# Patient Record
Sex: FEMALE | Race: WHITE | NOT HISPANIC OR LATINO
[De-identification: names, ages, dates, MRNs, and addresses within clinical notes are randomized per-mention and may not be internally consistent; named-entity substitution may affect disease eponyms.]

---

## 2017-01-04 ENCOUNTER — MEDICATION RENEWAL (OUTPATIENT)
Age: 47
End: 2017-01-04

## 2022-12-23 ENCOUNTER — EMERGENCY (EMERGENCY)
Facility: HOSPITAL | Age: 52
LOS: 1 days | Discharge: HOME | End: 2022-12-23
Attending: EMERGENCY MEDICINE | Admitting: EMERGENCY MEDICINE
Payer: COMMERCIAL

## 2022-12-23 VITALS
DIASTOLIC BLOOD PRESSURE: 83 MMHG | TEMPERATURE: 97 F | SYSTOLIC BLOOD PRESSURE: 122 MMHG | OXYGEN SATURATION: 93 % | WEIGHT: 145.06 LBS | RESPIRATION RATE: 16 BRPM | HEIGHT: 65 IN | HEART RATE: 84 BPM

## 2022-12-23 VITALS
RESPIRATION RATE: 18 BRPM | HEART RATE: 69 BPM | DIASTOLIC BLOOD PRESSURE: 85 MMHG | OXYGEN SATURATION: 95 % | SYSTOLIC BLOOD PRESSURE: 138 MMHG | TEMPERATURE: 97 F

## 2022-12-23 DIAGNOSIS — Z91.14 PATIENT'S OTHER NONCOMPLIANCE WITH MEDICATION REGIMEN: ICD-10-CM

## 2022-12-23 DIAGNOSIS — U07.1 COVID-19: ICD-10-CM

## 2022-12-23 DIAGNOSIS — F39 UNSPECIFIED MOOD [AFFECTIVE] DISORDER: ICD-10-CM

## 2022-12-23 DIAGNOSIS — F31.81 BIPOLAR II DISORDER: ICD-10-CM

## 2022-12-23 DIAGNOSIS — F17.290 NICOTINE DEPENDENCE, OTHER TOBACCO PRODUCT, UNCOMPLICATED: ICD-10-CM

## 2022-12-23 DIAGNOSIS — T42.6X2A POISONING BY OTHER ANTIEPILEPTIC AND SEDATIVE-HYPNOTIC DRUGS, INTENTIONAL SELF-HARM, INITIAL ENCOUNTER: ICD-10-CM

## 2022-12-23 DIAGNOSIS — R53.1 WEAKNESS: ICD-10-CM

## 2022-12-23 DIAGNOSIS — R11.2 NAUSEA WITH VOMITING, UNSPECIFIED: ICD-10-CM

## 2022-12-23 DIAGNOSIS — T51.0X2A TOXIC EFFECT OF ETHANOL, INTENTIONAL SELF-HARM, INITIAL ENCOUNTER: ICD-10-CM

## 2022-12-23 DIAGNOSIS — T42.4X2A POISONING BY BENZODIAZEPINES, INTENTIONAL SELF-HARM, INITIAL ENCOUNTER: ICD-10-CM

## 2022-12-23 LAB
ALBUMIN SERPL ELPH-MCNC: 3.7 G/DL — SIGNIFICANT CHANGE UP (ref 3.4–5)
ALBUMIN SERPL ELPH-MCNC: 4 G/DL — SIGNIFICANT CHANGE UP (ref 3.4–5)
ALP SERPL-CCNC: 60 U/L — SIGNIFICANT CHANGE UP (ref 40–120)
ALP SERPL-CCNC: 65 U/L — SIGNIFICANT CHANGE UP (ref 40–120)
ALT FLD-CCNC: 25 U/L — SIGNIFICANT CHANGE UP (ref 12–42)
ALT FLD-CCNC: 27 U/L — SIGNIFICANT CHANGE UP (ref 12–42)
ANION GAP SERPL CALC-SCNC: 11 MMOL/L — SIGNIFICANT CHANGE UP (ref 9–16)
ANION GAP SERPL CALC-SCNC: 9 MMOL/L — SIGNIFICANT CHANGE UP (ref 9–16)
APAP SERPL-MCNC: <2 UG/ML — LOW (ref 10–30)
AST SERPL-CCNC: 24 U/L — SIGNIFICANT CHANGE UP (ref 15–37)
AST SERPL-CCNC: 30 U/L — SIGNIFICANT CHANGE UP (ref 15–37)
BASOPHILS # BLD AUTO: 0.03 K/UL — SIGNIFICANT CHANGE UP (ref 0–0.2)
BASOPHILS # BLD AUTO: 0.04 K/UL — SIGNIFICANT CHANGE UP (ref 0–0.2)
BASOPHILS NFR BLD AUTO: 0.3 % — SIGNIFICANT CHANGE UP (ref 0–2)
BASOPHILS NFR BLD AUTO: 0.6 % — SIGNIFICANT CHANGE UP (ref 0–2)
BILIRUB SERPL-MCNC: 0.3 MG/DL — SIGNIFICANT CHANGE UP (ref 0.2–1.2)
BILIRUB SERPL-MCNC: 0.6 MG/DL — SIGNIFICANT CHANGE UP (ref 0.2–1.2)
BUN SERPL-MCNC: 13 MG/DL — SIGNIFICANT CHANGE UP (ref 7–23)
BUN SERPL-MCNC: 15 MG/DL — SIGNIFICANT CHANGE UP (ref 7–23)
CALCIUM SERPL-MCNC: 9 MG/DL — SIGNIFICANT CHANGE UP (ref 8.5–10.5)
CALCIUM SERPL-MCNC: 9.3 MG/DL — SIGNIFICANT CHANGE UP (ref 8.5–10.5)
CHLORIDE SERPL-SCNC: 104 MMOL/L — SIGNIFICANT CHANGE UP (ref 96–108)
CHLORIDE SERPL-SCNC: 107 MMOL/L — SIGNIFICANT CHANGE UP (ref 96–108)
CO2 SERPL-SCNC: 26 MMOL/L — SIGNIFICANT CHANGE UP (ref 22–31)
CO2 SERPL-SCNC: 27 MMOL/L — SIGNIFICANT CHANGE UP (ref 22–31)
CREAT SERPL-MCNC: 0.86 MG/DL — SIGNIFICANT CHANGE UP (ref 0.5–1.3)
CREAT SERPL-MCNC: 0.99 MG/DL — SIGNIFICANT CHANGE UP (ref 0.5–1.3)
EGFR: 69 ML/MIN/1.73M2 — SIGNIFICANT CHANGE UP
EGFR: 82 ML/MIN/1.73M2 — SIGNIFICANT CHANGE UP
EOSINOPHIL # BLD AUTO: 0.05 K/UL — SIGNIFICANT CHANGE UP (ref 0–0.5)
EOSINOPHIL # BLD AUTO: 0.16 K/UL — SIGNIFICANT CHANGE UP (ref 0–0.5)
EOSINOPHIL NFR BLD AUTO: 0.5 % — SIGNIFICANT CHANGE UP (ref 0–6)
EOSINOPHIL NFR BLD AUTO: 2.2 % — SIGNIFICANT CHANGE UP (ref 0–6)
ETHANOL SERPL-MCNC: <3 MG/DL — SIGNIFICANT CHANGE UP
FLUAV AG NPH QL: SIGNIFICANT CHANGE UP
FLUBV AG NPH QL: SIGNIFICANT CHANGE UP
GLUCOSE SERPL-MCNC: 101 MG/DL — HIGH (ref 70–99)
GLUCOSE SERPL-MCNC: 129 MG/DL — HIGH (ref 70–99)
HCG SERPL-ACNC: 3 MIU/ML — SIGNIFICANT CHANGE UP
HCT VFR BLD CALC: 41.1 % — SIGNIFICANT CHANGE UP (ref 34.5–45)
HCT VFR BLD CALC: 45.2 % — HIGH (ref 34.5–45)
HGB BLD-MCNC: 13.2 G/DL — SIGNIFICANT CHANGE UP (ref 11.5–15.5)
HGB BLD-MCNC: 14.5 G/DL — SIGNIFICANT CHANGE UP (ref 11.5–15.5)
IMM GRANULOCYTES NFR BLD AUTO: 0.3 % — SIGNIFICANT CHANGE UP (ref 0–0.9)
IMM GRANULOCYTES NFR BLD AUTO: 1 % — HIGH (ref 0–0.9)
LIDOCAIN IGE QN: 56 U/L — LOW (ref 73–393)
LIDOCAIN IGE QN: 60 U/L — LOW (ref 73–393)
LYMPHOCYTES # BLD AUTO: 1.33 K/UL — SIGNIFICANT CHANGE UP (ref 1–3.3)
LYMPHOCYTES # BLD AUTO: 18.3 % — SIGNIFICANT CHANGE UP (ref 13–44)
LYMPHOCYTES # BLD AUTO: 2.01 K/UL — SIGNIFICANT CHANGE UP (ref 1–3.3)
LYMPHOCYTES # BLD AUTO: 21.4 % — SIGNIFICANT CHANGE UP (ref 13–44)
MAGNESIUM SERPL-MCNC: 1.9 MG/DL — SIGNIFICANT CHANGE UP (ref 1.6–2.6)
MCHC RBC-ENTMCNC: 30 PG — SIGNIFICANT CHANGE UP (ref 27–34)
MCHC RBC-ENTMCNC: 30.1 PG — SIGNIFICANT CHANGE UP (ref 27–34)
MCHC RBC-ENTMCNC: 32.1 GM/DL — SIGNIFICANT CHANGE UP (ref 32–36)
MCHC RBC-ENTMCNC: 32.1 GM/DL — SIGNIFICANT CHANGE UP (ref 32–36)
MCV RBC AUTO: 93.4 FL — SIGNIFICANT CHANGE UP (ref 80–100)
MCV RBC AUTO: 93.8 FL — SIGNIFICANT CHANGE UP (ref 80–100)
MONOCYTES # BLD AUTO: 0.43 K/UL — SIGNIFICANT CHANGE UP (ref 0–0.9)
MONOCYTES # BLD AUTO: 0.74 K/UL — SIGNIFICANT CHANGE UP (ref 0–0.9)
MONOCYTES NFR BLD AUTO: 5.9 % — SIGNIFICANT CHANGE UP (ref 2–14)
MONOCYTES NFR BLD AUTO: 7.9 % — SIGNIFICANT CHANGE UP (ref 2–14)
NEUTROPHILS # BLD AUTO: 5.23 K/UL — SIGNIFICANT CHANGE UP (ref 1.8–7.4)
NEUTROPHILS # BLD AUTO: 6.55 K/UL — SIGNIFICANT CHANGE UP (ref 1.8–7.4)
NEUTROPHILS NFR BLD AUTO: 69.6 % — SIGNIFICANT CHANGE UP (ref 43–77)
NEUTROPHILS NFR BLD AUTO: 72 % — SIGNIFICANT CHANGE UP (ref 43–77)
NRBC # BLD: 0 /100 WBCS — SIGNIFICANT CHANGE UP (ref 0–0)
NRBC # BLD: 0 /100 WBCS — SIGNIFICANT CHANGE UP (ref 0–0)
PCO2 BLDV: 43 MMHG — HIGH (ref 39–42)
PH BLDV: 7.42 — SIGNIFICANT CHANGE UP (ref 7.32–7.43)
PLATELET # BLD AUTO: 300 K/UL — SIGNIFICANT CHANGE UP (ref 150–400)
PLATELET # BLD AUTO: 302 K/UL — SIGNIFICANT CHANGE UP (ref 150–400)
PO2 BLDV: 43 MMHG — SIGNIFICANT CHANGE UP (ref 25–45)
POTASSIUM SERPL-MCNC: 4 MMOL/L — SIGNIFICANT CHANGE UP (ref 3.5–5.3)
POTASSIUM SERPL-MCNC: 4.5 MMOL/L — SIGNIFICANT CHANGE UP (ref 3.5–5.3)
POTASSIUM SERPL-SCNC: 4 MMOL/L — SIGNIFICANT CHANGE UP (ref 3.5–5.3)
POTASSIUM SERPL-SCNC: 4.5 MMOL/L — SIGNIFICANT CHANGE UP (ref 3.5–5.3)
PROT SERPL-MCNC: 7.6 G/DL — SIGNIFICANT CHANGE UP (ref 6.4–8.2)
PROT SERPL-MCNC: 8.1 G/DL — SIGNIFICANT CHANGE UP (ref 6.4–8.2)
RBC # BLD: 4.4 M/UL — SIGNIFICANT CHANGE UP (ref 3.8–5.2)
RBC # BLD: 4.82 M/UL — SIGNIFICANT CHANGE UP (ref 3.8–5.2)
RBC # FLD: 13.9 % — SIGNIFICANT CHANGE UP (ref 10.3–14.5)
RBC # FLD: 14.1 % — SIGNIFICANT CHANGE UP (ref 10.3–14.5)
RSV RNA NPH QL NAA+NON-PROBE: SIGNIFICANT CHANGE UP
SALICYLATES SERPL-MCNC: 0.9 MG/DL — LOW (ref 2.8–20)
SAO2 % BLDV: 76.6 % — SIGNIFICANT CHANGE UP (ref 67–88)
SARS-COV-2 RNA SPEC QL NAA+PROBE: DETECTED
SARS-COV-2 RNA SPEC QL NAA+PROBE: SIGNIFICANT CHANGE UP
SODIUM SERPL-SCNC: 142 MMOL/L — SIGNIFICANT CHANGE UP (ref 132–145)
SODIUM SERPL-SCNC: 142 MMOL/L — SIGNIFICANT CHANGE UP (ref 132–145)
TROPONIN I, HIGH SENSITIVITY RESULT: 19.9 NG/L — SIGNIFICANT CHANGE UP
WBC # BLD: 7.26 K/UL — SIGNIFICANT CHANGE UP (ref 3.8–10.5)
WBC # BLD: 9.41 K/UL — SIGNIFICANT CHANGE UP (ref 3.8–10.5)
WBC # FLD AUTO: 7.26 K/UL — SIGNIFICANT CHANGE UP (ref 3.8–10.5)
WBC # FLD AUTO: 9.41 K/UL — SIGNIFICANT CHANGE UP (ref 3.8–10.5)

## 2022-12-23 PROCEDURE — 71045 X-RAY EXAM CHEST 1 VIEW: CPT | Mod: 26

## 2022-12-23 PROCEDURE — 90792 PSYCH DIAG EVAL W/MED SRVCS: CPT | Mod: 95,GC

## 2022-12-23 PROCEDURE — 99234 HOSP IP/OBS SM DT SF/LOW 45: CPT

## 2022-12-23 RX ORDER — SODIUM CHLORIDE 9 MG/ML
1000 INJECTION INTRAMUSCULAR; INTRAVENOUS; SUBCUTANEOUS ONCE
Refills: 0 | Status: COMPLETED | OUTPATIENT
Start: 2022-12-23 | End: 2022-12-23

## 2022-12-23 RX ORDER — ACETAMINOPHEN 500 MG
650 TABLET ORAL ONCE
Refills: 0 | Status: COMPLETED | OUTPATIENT
Start: 2022-12-23 | End: 2022-12-23

## 2022-12-23 RX ORDER — ONDANSETRON 8 MG/1
4 TABLET, FILM COATED ORAL ONCE
Refills: 0 | Status: DISCONTINUED | OUTPATIENT
Start: 2022-12-23 | End: 2022-12-23

## 2022-12-23 RX ADMIN — Medication 1 MILLIGRAM(S): at 09:33

## 2022-12-23 RX ADMIN — SODIUM CHLORIDE 1000 MILLILITER(S): 9 INJECTION INTRAMUSCULAR; INTRAVENOUS; SUBCUTANEOUS at 09:32

## 2022-12-23 RX ADMIN — Medication 1 MILLIGRAM(S): at 17:23

## 2022-12-23 RX ADMIN — Medication 650 MILLIGRAM(S): at 12:42

## 2022-12-23 NOTE — ED BEHAVIORAL HEALTH ASSESSMENT NOTE - DETAILS
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

## 2022-12-23 NOTE — ED BEHAVIORAL HEALTH ASSESSMENT NOTE - HPI (INCLUDE ILLNESS QUALITY, SEVERITY, DURATION, TIMING, CONTEXT, MODIFYING FACTORS, ASSOCIATED SIGNS AND SYMPTOMS)
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 Psychiatry was consulted to assess for suicidality and for a mental health evaluation.    The patient was interviewed in the presence of a 1:1 by Tele[employer] in a private room. The patient was cooperative throughout the interview, and mostly calm though at times she appeared anxious and intermittently tearful. Upon introducing ourselves the patient began spitting up, complaining of nausea and vomiting if she sits up. She endorses being in the ED after she realized that she felt intensely weak and ill the morning after taking lamictal, valium and alcohol. She states that she drank two glasses of rum and coke last night, started feeling sad and guilty about her nanny dying over the summer, and texted her brother "I'm sorry. I wish I could have said goodbye to her" (referring to the nanny). She states that she transferred $2,000 to her boyfriend and $1,800 to her son because she was "paranoid" and thought that they may just want "my money". She now regrets that decision.  She then decided to take 8 tabs of lamictal and 4 tabs of Valium to help her fall asleep, but now thinks that this was an impulsive decision because of her impaired judgement because she was under the influence of alcohol. She denies that this was a suicide attempt, or that she had even any vague thoughts about not wanting to be alive during that period. Upon waking up this morning, she states that she felt very nauseated and called for help. She stated that when she took those pills she was not expecting that it would cause the uncomfortable physical symptoms that it did, she was worried for her health, and therefore wanted help.    The patient notes that she tends to have increased stress around holiday times, mainly because her children leave for a few days to spend time with her ex, and because she feels guilty "for having screwed up the marriage". Last year she presented to the ED with a similar presentation where she took her lithium while drinking, and was subsequently hospitalized at [facility]. She notes that she has been feeling more "carter" these past few weeks. She endorses impulsively spending more money than she'd want on clothes, but that she subsequently returns the clothes. She also states that she occasionally sends large sums of money to her family members when she feels guilty even when sober. Despite her feelings of anxiety and guilt she has however been able to show up for work regularly, sees approximately eight patients a day, and exercise in the mornings. She endorses seeing her therapist Zoraida Dubois and her psychiatrist Enoc fuentesn weekly basis. She endorses having previously stopped her lamotrigine but having restarted it three weeks ago. She states that she stopped her lamotrigine because she was unsure whether her Bipolar diagnosis is a real one, but that she restarted it and has been taking it as prescribed since for the past three weeks.    The patient was offered an inpatient admission but she denies it. 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, lives with her four children, is , has a boyfriend, has a PPHx of Bipolar Type II with one previous hospitalization at  following an overdose (pt denies was suicide attempt - reports taking normally prescribed lithium but combined w/ alcohol), currently taking lamotrigine, sees therapist Zoraida Dubois and psychiatrist Enoc Arrieta, with a substance use history of alcohol, psilocybin and ecstasy, PMHx of pelvic floor dysfunction who was BIBA after she alerted the  staff at the hotel that she was not feeling well after having 8 100 mg tabs of lamictal, 4 5 mg tabs of Valium and drinking alcohol. Psychiatry was consulted to assess for suicidality and for a mental health evaluation.    The patient was interviewed in the presence of a 1:1 by Telepsych in a private room. The patient was cooperative throughout the interview, and mostly calm though at times she appeared anxious and intermittently tearful. Upon introducing ourselves the patient began spitting up, complaining of nausea and vomiting if she sits up. She endorses being in the ED after she realized that she felt intensely weak and ill the morning after taking lamictal, valium and alcohol. She states that she drank two glasses of rum and coke last night, started feeling sad and guilty about her nanny dying over the summer, and texted her brother "I'm sorry. I wish I could have said goodbye to her" (referring to the nanny). She states that she transferred $2,000 to her boyfriend and $1,800 to her son because she was "paranoid" while intoxicated and after taking Valium, thinking that they may just want "my money". She now regrets that decision.  She later that night took 8 tabs of lamictal to help her fall asleep, but now thinks that this was an impulsive decision because of her impaired judgement because she was under the influence of alcohol. She denies that this was a suicide attempt, or that she had even any vague thoughts about not wanting to be alive during that period. Upon waking up this morning, she states that she felt very nauseated and called for help. She stated that when she took those pills she was not expecting that it would cause the uncomfortable physical symptoms that it did, she was worried for her health, and therefore wanted help.    The patient notes that she tends to have increased stress around holiday times, mainly because her children leave for a few days to spend time with her ex, and because she feels guilty "for having screwed up the marriage". Last year she presented to the ED with a similar presentation where she took her lithium while drinking, and was subsequently hospitalized at [place] [facility]. She notes that she has been feeling more "carter" these past few weeks. She is doubtful of her own diagnosis of bipolar disorder. She endorses impulsively spending more money than she'd want on clothes, but that she subsequently returns the clothes. She also states that she occasionally sends large sums of money to her family members when she feels guilty even when sober. Denies that this is out of character. Despite her feelings of anxiety and guilt she has however been able to show up for work regularly, sees approximately eight patients a day, and exercise in the mornings. She endorses seeing her therapist Zoraida Dubois and her psychiatrist Enoc colon weekly basis. She endorses having previously stopped her lamotrigine but having restarted it three weeks ago. She states that she stopped her lamotrigine because she was unsure whether her Bipolar diagnosis is a real one, but that she restarted it and has been taking it as prescribed since for the past three weeks.    The patient was offered an inpatient admission but she denies it.    See attending addendum for additional collateral from outpatient psychiatrist.

## 2022-12-23 NOTE — ED BEHAVIORAL HEALTH ASSESSMENT NOTE - RISK ASSESSMENT
The patient's chronic risk factors for suicide include her trauma history, her impulsivity her history of an overdose, her history of hospitalization, and her history of mood disorder diagnosis. Her modifiable risk factors include her non compliance on medication and her current substance use. Her protective risk factors include her responsibility towards her family, the support of her boyfriend, being employed, being future oriented, having stable housing, and having outpatient treatment.

## 2022-12-23 NOTE — ED PROVIDER NOTE - OBJECTIVE STATEMENT
52-year-old female with a past medical history of bipolar disorder presents to the ED with generalized weakness.  Patient endorses not being able to sleep tonight and taking more of her typical psychiatric medications.  Patient endorses taking 8 pills of 100 mg Lamictal, endorses alcohol ingestion, and endorses 4 pills of 5 mg of Valium.  Usual state of health prior to today.  She denies any chest pain, shortness of breath, nausea, vomiting, diarrhea.  She denies any fevers or chills.  She denies any other symptoms at bedside.

## 2022-12-23 NOTE — ED ADULT TRIAGE NOTE - CHIEF COMPLAINT QUOTE
BIBEMS for weakness of legs after taking about 8 tabs of Lamotrigine 100mg and 6 tabs of Diazepam 5mg to sleep. Denies SI/HI. BIBEMS for weakness of legs after taking about 8 tabs of Lamotrigine 100mg, 6 tabs of Diazepam 5mg, and ETOH to sleep. Denies SI/HI.

## 2022-12-23 NOTE — ED BEHAVIORAL HEALTH NOTE - BEHAVIORAL HEALTH NOTE
===================   PRE-HOSPITAL COURSE   ==================   SOURCE:  RN, (name), and ED documentation    DETAILS:  Pt bibEMS  after ingesting 8 tabs of lamotrigine and 6 valium. Pt stated that she ingested medication in an attempt to sleep.      ============   ED COURSE   ============   SOURCE: RN, Marva, and secondhand ED documentation.   ARRIVAL: Per RN patient bibEMS to ED. Patient cooperative with triage process.  But extremely   BELONGINGS:  Per RN, patient arrived with belongings. All belongings were provided to hospital security, and patient currently in a gown with a 1:1 staff member.   BEHAVIOR: RN described patient to be paranoid , remains in behavioral and impulse control, and is not in restraints.   Pt currently has 1:1 sitter.  Pt is not  displaying aggression towards staff or others. Per RN, pt presenting with anxious affect and mood is congruent with affect. Pt has a linear thought process.   RN stated that the patient is denying current SI/HI. Per RN pt denying  A/VH.  There are no visible marks, bruises, or lacerations on the body.   RN reported that the patient appears to be well groomed, has fair hygiene, and reports pt is IND with ADLs.    TREATMENT:  Lorazepam at 0900  VISITORS: Guero Rosado, at bedside.      ------------------------------------------------   COVID Exposure Screen- collateral (i.e. third-party, chart review, belongings, etc; include EMS and ED staff)   ---------------------------------------------------   1. Has the patient had a COVID-19 test in the last 90 days? Unknown.   2. Has the patient tested positive for COVID-19 antibodies? Unknown.   3.Has the patient received 2 doses of the COVID-19 vaccine?  Unknown.   4. In the past 10 days, has the patient been around anyone with a positive COVID-19 test?* Unknown.   5.Has the patient been out of [place] within the past 10 days? Unknown. ===================   PRE-HOSPITAL COURSE   ==================   SOURCE:  RN, (name), and ED documentation    DETAILS:  Pt bibEMS  after ingesting 8 tabs of lamotrigine and 6 valium. Pt stated that she ingested medication in an attempt to sleep.      ============   ED COURSE   ============   SOURCE: RN, Marva, and secondhand ED documentation.   ARRIVAL: Per RN patient bibEMS to ED. Patient cooperative with triage process.    BELONGINGS:  Per RN, patient arrived with belongings. All belongings were provided to hospital security, and patient currently in a gown with a 1:1 staff member.   BEHAVIOR: RN described patient to be paranoid , remains in behavioral and impulse control, and is not in restraints.   Pt currently has 1:1 sitter.  Pt is not  displaying aggression towards staff or others. Per RN, pt presenting with anxious affect and mood is congruent with affect. Pt has a linear thought process.   RN stated that the patient is denying current SI/HI. Per RN pt denying  A/VH.  There are no visible marks, bruises, or lacerations on the body.   RN reported that the patient appears to be well groomed, has fair hygiene, and reports pt is IND with ADLs.    TREATMENT:  Lorazepam at 0900  VISITORS: Guero Rosado, at bedside.      ------------------------------------------------   COVID Exposure Screen- collateral (i.e. third-party, chart review, belongings, etc; include EMS and ED staff)   ---------------------------------------------------   1. Has the patient had a COVID-19 test in the last 90 days? Unknown.   2. Has the patient tested positive for COVID-19 antibodies? Unknown.   3.Has the patient received 2 doses of the COVID-19 vaccine?  Unknown.   4. In the past 10 days, has the patient been around anyone with a positive COVID-19 test?* Unknown.   5.Has the patient been out of [place] within the past 10 days? Unknown.

## 2022-12-23 NOTE — ED BEHAVIORAL HEALTH ASSESSMENT NOTE - SUMMARY
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 Psychiatry was consulted to assess for suicidality and for a mental health evaluation.    The patient appears to have impulsively overdosed on lamotrigine, benzodiazepines and alcohol in the context of feeling increased amounts of guilt and anxiety about the upcoming holidays and time away from her children. She adamantly denies any suicidal intent and that the overdose was due to "just wanting to sleep" while she was inebriated and had impaired judgement. The patient is future oriented and treatment seeking, and able to safety plan. Though her presentation is concerning for her impulsivity and misjudgment, she is denying voluntary admission at the moment and there is not currently enough evidence to support a suicide attempt that warrants an involuntary admission. We recommend that she continue taking her medication as directed by her psychiatrist, follow up with weekly therapy, and attempt to cut down on her alcohol use.    - Continue home medications  - Follow up with psychiatrist and psychologist as outpatient  - For episodes of acute agitation can offer PO Haldol 2 mg/Ativan 1 mg/Benadryl 50 mg Q6H PRN. If refusing PO and is in acute risk of harm to self/other, can offer IM Haldol 5 mg/Versed 2 mg/Benadryl 50 mg Q6H PRN. 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 Psychiatry was consulted to assess for suicidality and for a mental health evaluation.    The patient appears to have impulsively overdosed on lamotrigine, benzodiazepines and alcohol in the context of feeling increased amounts of guilt and anxiety about the upcoming holidays and time away from her children. She adamantly denies any suicidal intent and that the overdose was due to "just wanting to sleep" while she was inebriated and had impaired judgement. The patient is future oriented and treatment seeking, and able to safety plan. Though her presentation is concerning for her impulsivity and misjudgment, she is denying voluntary admission at the moment and there is not currently enough evidence to support a suicide attempt that warrants an involuntary admission. We recommend that she continue taking her medication as directed by her psychiatrist, follow up with weekly therapy, and attempt to cut down on her alcohol use.    - psychiatrically stable for discharge  - discussed dispo w/ outpatient psychiatrist who will f/u with patient on 12/28 11am.

## 2022-12-23 NOTE — ED BEHAVIORAL HEALTH ASSESSMENT NOTE - NSBHSUBSTUSESTATEMENT_PSY_A_CORE
Patient contacted to review culture results. There was no answer, VM left to call the office and inform that antibiotic was send to pharmacy.
.

## 2022-12-23 NOTE — ED BEHAVIORAL HEALTH ASSESSMENT NOTE - NSBHSAHAL_PSY_A_CORE FT
Took ayahuasca under the guidance of linda venegas 3-5 weeks ago, took ecstasy and psilocybin 3-5 weeks ago

## 2022-12-23 NOTE — ED BEHAVIORAL HEALTH ASSESSMENT NOTE - ADDITIONAL DETAILS ALL
Questionable suicide attempt a year ago following taking lithium and alcohol. Patient denies this was a suicide attempt.

## 2022-12-23 NOTE — ED ADULT NURSE NOTE - NSIMPLEMENTINTERV_GEN_ALL_ED
Implemented All Fall Risk Interventions:  [place] to call system. Call bell, personal items and telephone within reach. Instruct patient to call for assistance. Room bathroom lighting operational. Non-slip footwear when patient is off stretcher. Physically safe environment: no spills, clutter or unnecessary equipment. Stretcher in lowest position, wheels locked, appropriate side rails in place. Provide visual cue, wrist band, yellow gown, etc. Monitor gait and stability. Monitor for mental status changes and reorient to person, place, and time. Review medications for side effects contributing to fall risk. Reinforce activity limits and safety measures with patient and family.

## 2022-12-23 NOTE — ED BEHAVIORAL HEALTH ASSESSMENT NOTE - MEDICATIONS (PRESCRIPTIONS, DIRECTIONS)
Continue home medications For episodes of acute agitation can offer PO Haldol 2 mg/Ativan 1 mg/Benadryl 50 mg Q6H PRN. If refusing PO and is in acute risk of harm to self/other, can offer IM Haldol 5 mg/Versed 2 mg/Benadryl 50 mg Q6H PRN. If given, please repeat EKG and hold antipsychotics if QTC>500

## 2022-12-23 NOTE — ED BEHAVIORAL HEALTH ASSESSMENT NOTE - NSBHATTESTCOMMENTATTENDFT_PSY_A_CORE
The patient is a 51 yo woman, , in relationship w/ boyfriend, living with her 4 children (ages 14, 16, 18, 22), employed as a psychologist and currently still active, with psych hx of bipolar 2 disorder, one prior hospitalization at [facility] in Dec 2021, alcohol and benzodiazepine abuse, who is BIB EMS after pt called  of hotel where she was staying at for assistance, due to feeling unwell following consumption of excess alcohol, Valium (4 tabs of 5mg), and Lamictal (8 tabs of 100mg). Pt in the ED complains of severe nausea/vomiting but denies psychiatric concerns, and adamantly denies having any suicidal intent or ideation. She reports usually being stressed around the holidays due to her relationships, namely that her kids are spending the 24th with her ex-, and she has been ruminating on this. She describes additional stressor of her nanny passing away recently, for which she occasionally blames herself. Per collateral from boyfriend, pt reportedly has endorsed guilt regarding her family relationships and her divorce as well. Patient denies any recent depressive symptoms or difficulty functioning. She is motivated by her work, and she very strongly does not wish to be hospitalized at this time. Collateral from boyfriend supports that there is concern around patient's impulsivity, but there is no indication that this was a suicide attempt, and no acute safety concerns are elicited (see SW note for details). Patient's outpatient psychiatrist relates that he does not believe patient to be suicidal, but he is more concerned the fact that patient seems to be in denial of her diagnosis of bipolar illness, and therefore is intermittently noncompliant w/ meds. He says when pt is manic, she exhibits rapid speech and overexcitability. He last saw her on 12/6 and it wasn't clear she was symptomatic at that time, but he believes she may have been entering a mixed state. Plan was to restart Lamictal & titrate to 200mg, patient's usual dose. Psychiatrist thinks admission would be helpful from the standpoint of improving patient's insight around her illness, and given her emotional instability during the holiday season, but there is no indication of any acute safety concern. Psychiatrist states he can and would like to see patient for f/u on Wed 12/28. Given that the patient emphatically denies her behavior was a suicide attempt, it was impacted by intoxication well before pt decided to take Lamictal for sleep, pt did not expect any harmful consequences and was surprised, she provides a reasonable history consistent with all corroborative and does not exhibit any signs of alva/acute mood instability or inability to care for herself, she is certain she can manage her mood symptoms the next several days in light of the stressors, and very strongly wishes to be discharged, she does not meet criteria for involuntary hospitalization. She consistently denies any SI currently. Furthermore, she has numerous protective factors such as her employment, her kids, cognitive baseline, and engagement in outpatient treatment. Given patient's hx of unstable relationships (both with past therapists and partners), her chronic affective instability stemming from interpersonal hypersensitivity (and seemingly inconsistent w/ bipolar sx), her impulsivity, and unstable sense of self, along with self-reported trauma, she may be exhibiting borderline personality traits. The patient is a 51 yo woman, , in relationship w/ boyfriend, living with her 4 children (ages 14, 16, 18, 22), employed as a psychologist and currently still active, with psych hx of bipolar 2 disorder, one prior hospitalization at [facility] in Dec 2021, alcohol and benzodiazepine abuse, who is BIB EMS after pt called  of hotel where she was staying at for assistance, due to feeling unwell following consumption of excess alcohol, Valium (4 tabs of 5mg), and Lamictal (8 tabs of 100mg). Pt in the ED complains of severe nausea/vomiting but denies psychiatric concerns, and adamantly denies having any suicidal intent or ideation. She reports usually being stressed around the holidays due to her relationships, namely that her kids are spending the 24th with her ex-, and she has been ruminating on this. She describes additional stressor of her nanny passing away recently, for which she occasionally blames herself. Per collateral from boyfriend, pt reportedly has endorsed guilt regarding her family relationships and her divorce as well. Patient denies any recent depressive symptoms or difficulty functioning. She is motivated by her work, and she very strongly does not wish to be hospitalized at this time. Collateral from boyfriend supports that there is concern around patient's impulsivity, but there is no indication that this was a suicide attempt, and no acute safety concerns are elicited (see SW note for details). Patient's outpatient psychiatrist relates that he does not believe patient to be suicidal, but he is more concerned the fact that patient seems to be in denial of her diagnosis of bipolar illness, and therefore is intermittently noncompliant w/ meds. He says when pt is manic, she exhibits rapid speech and overexcitability. He last saw her on 12/6 and it wasn't clear she was symptomatic at that time, but he believes she may have been entering a mixed state. Plan was to restart Lamictal & titrate to 200mg, patient's usual dose. Psychiatrist thinks admission would be helpful from the standpoint of improving patient's insight around her illness, and given her emotional instability during the holiday season, but there is no indication of any acute safety concern. Psychiatrist states he can and would like to see patient for f/u on Wed 12/28. Given that the patient emphatically denies her behavior was a suicide attempt, she was impacted by intoxication well before deciding to take Lamictal for sleep, pt did not expect any harmful consequences and was surprised by the physical symptoms that ensued, she provides a reasonable history consistent with all corroborative and does not exhibit any signs of alva/acute mood instability or inability to care for herself, she is certain she can manage her mood symptoms the next several days in light of the stressors, and pt very strongly wishes to be discharged, she does not meet criteria for involuntary hospitalization. She consistently denies any SI currently. Furthermore, she has numerous protective factors such as her employment, her kids, cognitive baseline, and engagement in outpatient treatment. Given patient's hx of unstable relationships (both with past therapists and partners), her chronic affective instability stemming from interpersonal hypersensitivity (and seemingly inconsistent w/ bipolar sx), her impulsivity, and unstable sense of self, along with self-reported trauma, she may be exhibiting borderline personality traits.

## 2022-12-23 NOTE — ED BEHAVIORAL HEALTH ASSESSMENT NOTE - OTHER PAST PSYCHIATRIC HISTORY (INCLUDE DETAILS REGARDING ONSET, COURSE OF ILLNESS, INPATIENT/OUTPATIENT TREATMENT)
Outpatient   - Sees Dr. Enoc Arrieta, psychiatrist once a week  - Currently prescribed lamotrigine by him as well as benzodiazepine  Inpatient  - Sees Zoraida Dubois on a weekly basis, therapist    Inpatient  - Hospitalization at [facility] last year

## 2022-12-23 NOTE — ED ADULT NURSE NOTE - CHIEF COMPLAINT QUOTE
BIBEMS for weakness of legs after taking about 8 tabs of Lamotrigine 100mg, 6 tabs of Diazepam 5mg, and ETOH to sleep. Denies SI/HI.

## 2022-12-23 NOTE — ED PROVIDER NOTE - PROGRESS NOTE DETAILS
ARAVIND: Patient signed out to me by Dr. Castro. Reports toxicology recommended obs and repeat EKG in 6 hours. WIll place patient on observation.

## 2022-12-23 NOTE — ED BEHAVIORAL HEALTH ASSESSMENT NOTE - DIFFERENTIAL
Attending Only
Bipolar disorder vs Personality disorder vs Substance Induced mood disorder vs Alcohol use disorder

## 2022-12-23 NOTE — ED ADULT NURSE NOTE - HIV OFFER
Detail Level: Zone
Text: The above diagnosis and findings were noted on the exam but not discussed at this time with the patient.
Opt out

## 2022-12-23 NOTE — ED ADULT NURSE NOTE - OBJECTIVE STATEMENT
51 y/o female who decided  to take maybe 6 pills of Lamictal and unknown amount dose of valium with alcohol around 10pm last night. pt woke up at 6am crawled to the bathroom because she was nauseous but called 911 because " she couldn't move" pt is A+Ox3 denies SI/HI/AH/VH- pt states she just wanted to sleep at the time.

## 2022-12-23 NOTE — ED CDU PROVIDER DISPOSITION NOTE - CLINICAL COURSE
Patient after overdose, had tox and psych work-up, cleared by psych, now with intractable vomiting and inability to walk. Unable to give antiemetics due to prolonged QT. Accepted to [facility] under Dr. Avalos. Patient after overdose, had tox and psych work-up, cleared by psych, now with intractable vomiting and inability to walk. Unable to give antiemetics due to prolonged QT. Accepted to [facility] under Dr. Avalos.  21:00 Patient now ambulatory, tolerating PO, declined admission. Patient's adult son coming to pick her up.

## 2022-12-23 NOTE — ED ADULT TRIAGE NOTE - WEIGHT IN LBS
145
implants : acet 54 mm crown cluster cup w/ 0 degree xle liner, femur #10 standard offset collarless element stem w/ 36+0 biolox head

## 2022-12-23 NOTE — ED BEHAVIORAL HEALTH ASSESSMENT NOTE - DESCRIPTION
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 Currently lives with four children. Is , has a current boyfriend.

## 2022-12-23 NOTE — ED ADULT NURSE NOTE - CAS EDN DISCHARGE INTERVENTIONS
1450)  AM-PAC Inpatient without Stair Climbing T-Scale Score : 34.07 (06/04/19 1450)  Mobility Inpatient CMS 0-100% Score: 71.66 (06/04/19 1450)  Mobility Inpatient without Stair CMS G-Code Modifier : CL (06/04/19 1450)       Goals  Short term goals  Time Frame for Short term goals: 1 week (6/10) unless otherwise specified  Short term goal 1: pt to move supine to and from sit with CG  Short term goal 2: pt to move sit to and from stand with min assist of 1  Short term goal 3: pt to amb with SW 25 ft with min assist WBAT  Short term goal 4: pt to participate in 12-15 reps LE ex by 6/5  Patient Goals   Patient goals : \"to heal and walk\"    Plan    Plan  Times per week: 7 days wk  Times per day: Daily  Specific instructions for Next Treatment: ther ex, mobility, progress as tolerated  Current Treatment Recommendations: Strengthening, Gait Training, Functional Mobility Training, Endurance Training, Safety Education & Training, Transfer Training  Safety Devices  Type of devices:  All fall risk precautions in place, Call light within reach, Gait belt, Patient at risk for falls, Nurse notified, Bed alarm in place, Left in bed     Therapy Time   Individual Concurrent Group Co-treatment   Time In 1040         Time Out 1105         Minutes 25         Timed Code Treatment Minutes: 1440 [facility] IV discontinued, cath removed intact

## 2022-12-23 NOTE — ED CDU PROVIDER INITIAL DAY NOTE - PROGRESS NOTE DETAILS
Patient complained of nausea. Unable to give typical antiemetics due to prolonged QT. When I explained issue to patient and , patient began hyperventilating. GIven ativan 1mg IV. Although valium was part of patient's overdose, patient is fully awake now. Extensive discussion with  regarding events of yesterday. He said patient has been more agitated the past few days. Told something to her brother yesterday that she regretted saying. [facility] Said she just wanted to sleep. Took the  medications as documented in ED Provider note. Texted , ex-, and brother "I'm sorry" and "good-bye" multiple times. Sent large amounts of money to her . Patient's childhood   recently in a nursing home and patient is fixated on idea that she cause the 's death. Also has h/o manic episode with psychosis last [place] leading to hospitalization at [address]. Was treated successfully with lithium but then started mixing it with alcohol. Switched to lamotrigine. Has been on and off lamotrigine for the past year. Patient complained of nausea. Unable to give typical antiemetics due to prolonged QT. When I explained issue to patient and boyfriend, patient began hyperventilating. Given ativan 1mg IV. Although valium was part of patient's overdose, patient is fully awake now. Extensive discussion with  regarding events of yesterday. He said patient has been more agitated the past few days. Told something to her brother yesterday that she regretted saying. [facility] Said she just wanted to sleep. Took the  medications as documented in ED Provider note. Texted , ex-, and brother "I'm sorry" and "good-bye" multiple times. Sent large amounts of money to her . Patient's childhood   recently in a nursing home and patient is fixated on idea that she cause the 's death. Also has h/o manic episode with psychosis last [place] leading to hospitalization at [place]. Was treated successfully with lithium but then started mixing it with alcohol. Switched to lamotrigine. Has been on and off lamotrigine for the past year. Spoke to patient's psychiatrist Dr. Enoc Arrieta. [phone] Takes the diazepam for pelvic floor disorder. States patient is in an uncontrolled mixed manic state. Has been fired by multiple psychiatrists. He said that patient admits to med non-compliance. Thinks patient needs acute psychiatric admission to be stabilized. Called by [facility]. They recommended discharge. Said they spoke with patient's psychiatrist, who agreed that patient is not suicidal. Did not meet involuntary criteria. Psychiatrist wants to see patient on Wednesday 12/28 11am. Patient stood up, felt very weak, almost fell. Feels lightheaded. Denies vertigo, headache, focal weakness, paresthesias. Will give food, IVF, reassess. Patient has not eaten since yesterday. Patient still vomiting and unable to ambulate. Admitted under Dr. Avalos at [facility].

## 2022-12-23 NOTE — ED ADULT NURSE NOTE - EXTENSIONS OF SELF_ADULT
MD was given message on 3/20/17  And has been reminded of encounter.3/23/17   
Patient brought in his home blood pressure monitor to check against clinic blood pressure cuffs for accuracy. Readings fairly consistent.    Patient also brought along his medications lists w/ times he is taking his medications. Pt is questioning if MD would review the  List and recommend any changes that would be needed. Should patient take medications more in a group instead of spreading them out?    Patient stated his home weight after getting out of the shower today was 234.5 pounds  
None

## 2022-12-23 NOTE — ED PROVIDER NOTE - NS ED ATTENDING STATEMENT MOD
Attending Only This was a shared visit with the DWAYNE. I reviewed and verified the documentation and independently performed the documented:

## 2022-12-23 NOTE — ED BEHAVIORAL HEALTH ASSESSMENT NOTE - NSBHSAALC_PSY_A_CORE FT
States she drinks approximately two alcoholic beverages a day, she feels like in the past few weeks she has been drinking more alcohol than previously States she drinks approximately two alcoholic beverages a day, she feels like in the past few weeks she has been drinking more alcohol than previously. Yest reports 3 mixed drinks

## 2022-12-23 NOTE — ED PROVIDER NOTE - ATTENDING APP SHARED VISIT CONTRIBUTION OF CARE
52-year-old female presents to ED after accidental overdose - pt states she took 8 pills of 100 mg Lamictal, endorses alcohol ingestion, and endorses 4 pills of 5 mg of Valium. EKG noted with slightly long Qtc. consulted PCC - see PA note for details, recc admit to obs/monitor for 6 hours then ok to be dced home if no arrythmias.  Patient denies any SI HI.  She denies any auditory visual hallucinations.  Patient took extra medications because she was "unable to sleep ". Spoke with her partner at bedside, he also feels she was trying to induce sleep that it was not an intentional overdose.

## 2022-12-23 NOTE — ED CDU PROVIDER DISPOSITION NOTE - PATIENT PORTAL LINK FT
You can access the FollowMyHealth Patient Portal offered by [facility] by registering at the following website: [email or web address]. By joining [facility]’s FollowMyHealth portal, you will also be able to view your health information using other applications (apps) compatible with our system.

## 2022-12-23 NOTE — ED BEHAVIORAL HEALTH ASSESSMENT NOTE - REFERRAL / APPOINTMENT DETAILS
Has appointment with Dr. Fiore (psychiatrist) for Dec 28. Will continue weekly sessions with Zoraida Dubois.

## 2022-12-23 NOTE — ED PROVIDER NOTE - PHYSICAL EXAMINATION
GENERAL: no acute distress, non-toxic appearing  HEAD: normocephalic, atraumatic  HEENT: normal conjunctiva, oral mucosa moist, neck supple  CARDIAC: regular rate and rhythm, normal S1 and S2,  no appreciable murmurs  PULM: clear to ascultation bilaterally, no crackles, rales, rhonchi, or wheezing  GI: abdomen nondistended, soft, nontender, no guarding or rebound tenderness  : no CVA tenderness, no suprapubic tenderness  NEURO: alert and oriented x 3, normal speech, PERRLA, EOMI, no focal motor or sensory deficits  MSK: no visible deformities, no peripheral edema, calf tenderness/redness/swelling  SKIN: no visible rashes, dry, well-perfused

## 2022-12-23 NOTE — ED CDU PROVIDER INITIAL DAY NOTE - CLINICAL SUMMARY MEDICAL DECISION MAKING FREE TEXT BOX
52-year-old female with a past medical history of bipolar disorder presents to the ED with generalized weakness. Patient endorses taking 8 pills of 100 mg Lamictal, endorses alcohol ingestion, and endorses 4 pills of 5 mg of Valium. Initial vitals nonactionable.  Physical exam as noted above.  Well-appearing female without any acute distress.  Abdomen is benign nontender.  Initial EKG demonstrating normal sinus rhythm with a prolonged QT, QTC read as 490 MS.  No ST elevations or pathologic T wave inversions.  Patient discussed at length with toxicology (Dr. Díaz), recommending serial EKGs and monitor for 6 hours for symptoms.  Will order labs, EKG, meds, imaging, reassess. Patient denies any SI HI.  She denies any auditory visual hallucinations.  Patient took extra medications because she was "unable to sleep ".

## 2022-12-23 NOTE — ED BEHAVIORAL HEALTH NOTE - BEHAVIORAL HEALTH NOTE
========================  FOR EACH COLLATERAL  ========================  Collateral below has requested that the information provided remain confidential: Yes [  ] No [  X]  Collateral below has provided information that patient is/may be unaware of: Yes [  ] No [X  ]  Patient gives permission to obtain collateral from _____:  (  ) Yes  ( X )  No  Rationale for overriding objection            (  ) Lack of capacity. Details: ________            (  ) Assessing risk of danger to self/others. Details: ________  Rationale for selecting specific collateral source            (  ) Potential to impact risk of danger to self/others and no alternative equivalent. Details: _____  NAME: Guero.   NUMBER:  [phone]  RELATIONSHIP: Boyfriend.   RELIABILITY: Reliable.   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 Alleged hx of pt misusing polysubstance including Adderall, Valium etc.   DATE HPI STARTED: Unable to determine.   DECOMPENSATION: Today, pt was in a hotel room and called the . The  then activated 911. Upon the police arriving to the room, they had to break the door. When police went inside the room pt was naked. It's unclear what pt told the  staff that prompted them to activate 911.   Overall, collateral reported that pt is a "an incredible smart woman." It's unclear if pt made the attempt to end her life today or she only wanted to sleep but pt's current stressors are that it's the holidays, pt's divorce, pt believes that she destroyed her previous marriage and that it'lis  her fault. Pt continues to feel worthless and blames herself.  Collateral reported that pt's relationship is good with her children but her relationship with her ex- is not as great.  Pt's sleep and appetite have been stable/at baseline.   SUICIDALITY: When CM asked about suicidality collateral reported that due to pt's profession she would never admit if she has thoughts of SI because she understands what it means and what consequences would arise if pt made comments related to suicidality.   VIOLENCE: Declined.   SUBSTANCE:  Collateral reported that pt continues to "always" drink but pt can also go without drinking. Denied illicit drug use but collateral reported that pt has used Adderall and Vicodin in the past.   ========================  PAST PSYCHIATRIC HISTORY  ========================  DATE PAST PSYCHIATRIC HISTORY STARTED:  Unable to endorse.   MAIN PSYCHIATRIC DIAGNOSIS: Bipolar d/o.   PSYCHIATRIC HOSPITALIZATIONS: Pt was admitted to [facility] in October 2021 after an overdose/unclear if intentional. Pt also had another ED visit on Christmas 2021 after pt mixed with drinking alcohol and using valium.   PRIOR ILLNESS: Pt has a psychiatrist that she sees once a month. Pt also recently started to see a therapist whom she likes. However, collateral assumes that soon pt will begin too no longer like the therapist.  Collateral feels that pt usually starts a therapeutic relationship and then after a while she decides to discontinue the relationship and look for another therapist.   SUICIDALITY: None known.   VIOLENCE: Declined. Collateral only alleged that pt can become verbally aggressive at times.   SUBSTANCE USE: Please see above.   ==============  OTHER HISTORY  ==============  CURRENT MEDICATION: Per documentation pt takes Lamotrigine 100mg and Diazepam 5mg. Pt has taken Lithium in the past but it made her gain weight. Therefore, pt stopped taking it.   MEDICAL HISTORY: No pmhx.   ALLERGIES: None known.   LEGAL ISSUES: Pt had court dates before when she dealt with the divorce, 10 years ago.   FIREARM ACCESS: None known.   SOCIAL HISTORY: Hx of alleged trauma related to childhood upbringing.   FAMILY HISTORY: Collateral reported vague hx of mental illness but no specifics were provided.   DEVELOPMENTAL HISTORY: Unknown.   -----------------------------------------------  COVID Exposure Screen- collateral (i.e. third-party, chart review, belongings, etc; include EMS and ED staff)  ---------------------------------------------------  1. Has the patient had a COVID-19 test in the last 90 days? 10 days ago. Street test/has an email to prove it.   2. Has the patient tested positive for COVID-19 antibodies? Unknown.  3.Has the patient received 2 doses of the COVID-19 vaccine?  Unknown.  4. In the past 10 days, has the patient been around anyone with a positive COVID-19 test?* Unknown.5.Has the patient been out of [place] within the past 10 days? Unknown.

## 2022-12-23 NOTE — ED CDU PROVIDER DISPOSITION NOTE - NSFOLLOWUPINSTRUCTIONS_ED_ALL_ED_FT
Intentional Drug Overdose    A prescription pill bottle with an example of a pill.   An intentional drug overdose refers to taking too much of a drug to get high or for the purpose of harming yourself. An overdose can occur with illegal drugs, prescription medicines, or over-the-counter (OTC) medicines.    The effects of an intentional drug overdose can be mild, dangerous, or even deadly.      What are the causes?    A drug overdose is caused by taking too much of a drug. Drugs that commonly cause overdose include:  •Pain medicines.      •Medicines to treat mental health conditions.      •Illegal drugs.        What increases the risk?    The risk of a drug overdose is higher for someone who:  •Takes illegal drugs.      •Takes a drug and drinks alcohol.      •Takes multiple drugs.      •Has a mental health condition.        What are the signs or symptoms?    Symptoms of a drug overdose depend on the drug and the amount that was taken. Common danger signs include:  •Behavior changes.      •Sleepiness.      •Slowed breathing.      •Nausea and vomiting.      •Seizures.      •Changes in eye pupil size. The pupil may be very large or very small.      If there are signs of very low blood pressure (shock) from an overdose, emergency treatment is required. These signs include:  •Cold and clammy skin.      •Pale skin.      •Blue lips.      •Very slow breathing.      •Extreme sleepiness.      •Loss of consciousness.        How is this diagnosed?    This condition may be diagnosed based on your symptoms. It is important to tell your health care provider:  •All of the drugs that you took.      •When you took the drugs.      •Whether you were drinking alcohol.      Your health care provider will do a physical exam. This exam may include:  •Checking and monitoring your heart rate and rhythm, your temperature, and your blood pressure (vital signs).      •Checking your breathing and oxygen level.      You may also have tests, including urine or blood tests.      How is this treated?    This condition requires immediate medical treatment and hospitalization. Treatment will focus on supporting normal body functions, such as breathing, and preventing complications. Treatment may include:  •Giving fluids and electrolytes through an IV.      •Inserting a breathing tube in your airway to help you breathe.      •Passing a tube through your nose and into your stomach (nasogastric tube, NG tube) to wash out your stomach.      •Filtering your blood through an artificial kidney machine (hemodialysis).      •Ongoing counseling and mental health support.    •Giving medicines that:  •Make you vomit.      •Absorb any medicine that is left in your digestive system.      •Block or reverse the effect of the drug that caused the overdose (antidote).          Follow these instructions at home:    Medicines     •Take over-the-counter and prescription medicines only as told by your health care provider. Always ask your health care provider about possible side effects of any new drug that you start taking.      •Keep a list of all the drugs that you take, including over-the-counter medicines. Bring this list with you to all your medical visits.      Alcohol use   • Do not drink alcohol if:  •Your health care provider tells you not to drink.      •You are pregnant, may be pregnant, or are planning to become pregnant.      •If you drink alcohol: •Limit how much you have to:  •0–1 drink a day for women.      •0–2 drinks a day for men.        •Know how much alcohol is in your drink. In the [place] one drink equals one 12 oz bottle of beer (355 mL), one 5 oz glass of wine (148 mL), or one 1½ oz glass of hard liquor (44 mL).          General instructions   A comparison of three sample cups showing dark yellow, yellow, and pale yellow urine.    •Drink enough fluid to keep your urine pale yellow.      •If you are working with a counselor or mental health professional, make sure to follow his or her instructions.      •Keep all follow-up visits. This is important.        How is this prevented?  •Get help if you are struggling with:  •Alcohol or drug use.      •Depression or another mental health problem.        •Keep the phone number of your local poison control center near your phone or on your mobile phone.      •Read the drug inserts that come with your medicines.      • Do not use illegal drugs.      • Do not drink alcohol when taking drugs.      • Do not take medicines that are not prescribed for you.        Where to find support    If you think that you are addicted to a drug or that you have a problem with drug use, you may benefit from receiving support for quitting from a local support group or counselor. Ask your health care provider for a referral to these resources in your area.      Where to find more information    •Substance Abuse and Mental Health Services Administration (SAMHSA): www.samhsa.gov      •National [employer] on Mental Illness (JOSE): www.jose.org        Contact a health care provider if:    •Your symptoms return.      •You develop new symptoms or side effects when you take medicines.        Get help right away if:    •You think that you or someone else may have taken too much of a drug. The American Association of Poison Control Centers hotline is 1-278.647.2919.      •You or someone else is having symptoms of a drug overdose.      •You become confused.    •You have:  •Chest pain.      •Trouble breathing.      •A loss of consciousness.        •You have serious thoughts about hurting yourself or others.      Drug overdose is an emergency. Do not wait to see if the symptoms will go away. Get medical help right away. Call your local emergency services (651 in the [place] Do not drive yourself to the hospital.     If you ever feel like you may hurt yourself or others, or have thoughts about taking your own life, get help right away. Go to your nearest emergency department or:   • Call your local emergency services (314 in the [place]       • Call a suicide crisis helpline, such as the National Suicide Prevention Lifeline at 1-124.410.3140 or 388 in the [place] This is open 24 hours a day in the [place]       • Text the Crisis Text Line at 732624 (in the [place]         Summary    •A drug overdose happens when you take too much of a drug.      •An overdose can occur with illegal drugs, prescription medicines, or over-the-counter (OTC) medicines.      •This condition requires immediate medical treatment and hospitalization.      This information is not intended to replace advice given to you by your health care provider. Make sure you discuss any questions you have with your health care provider.      Document Revised: 07/13/2022 Document Reviewed: 04/15/2022    Elsevier Patient Education © 2022 [employer]

## 2023-05-16 PROBLEM — F31.9 BIPOLAR DISORDER, UNSPECIFIED: Chronic | Status: ACTIVE | Noted: 2022-12-23

## 2023-05-18 ENCOUNTER — OUTPATIENT (OUTPATIENT)
Dept: OUTPATIENT SERVICES | Facility: HOSPITAL | Age: 53
LOS: 1 days | End: 2023-05-18
Payer: COMMERCIAL

## 2023-05-18 DIAGNOSIS — N64.52 NIPPLE DISCHARGE: ICD-10-CM

## 2023-05-18 LAB — SURGICAL PATHOLOGY STUDY: SIGNIFICANT CHANGE UP

## 2023-05-18 PROCEDURE — 88321 CONSLTJ&REPRT SLD PREP ELSWR: CPT

## 2023-05-22 ENCOUNTER — TRANSCRIPTION ENCOUNTER (OUTPATIENT)
Age: 53
End: 2023-05-22

## 2023-05-22 PROCEDURE — 88307 TISSUE EXAM BY PATHOLOGIST: CPT | Mod: 26

## 2023-05-22 PROCEDURE — 88305 TISSUE EXAM BY PATHOLOGIST: CPT | Mod: 26

## 2023-05-22 NOTE — ASU PATIENT PROFILE, ADULT - ABLE TO REACH PT
Instruction and time was left on voicemail, patient instructed to arrive at 06:30am. No solid food/dairy/candy/gum after 10:30pm tonight, water allowed before 05:30am Tuesday; patient to bring photo ID/Insurance card; dress in comfortable clothes; no jewelries/contact lens/valuables; no smoking/alcohol/recreational drug use today; escort to come with photo ID; address and callback number was given. MD was notified./no

## 2023-05-22 NOTE — ASU PATIENT PROFILE, ADULT - FALL HARM RISK - UNIVERSAL INTERVENTIONS
Bed in lowest position, wheels locked, appropriate side rails in place/Call bell, personal items and telephone in reach/Instruct patient to call for assistance before getting out of bed or chair/Non-slip footwear when patient is out of bed/[place] to call system/Physically safe environment - no spills, clutter or unnecessary equipment/Purposeful Proactive Rounding/Room/bathroom lighting operational, light cord in reach

## 2023-05-23 ENCOUNTER — OUTPATIENT (OUTPATIENT)
Dept: OUTPATIENT SERVICES | Facility: HOSPITAL | Age: 53
LOS: 1 days | Discharge: HOME | End: 2023-05-23
Payer: COMMERCIAL

## 2023-05-23 ENCOUNTER — TRANSCRIPTION ENCOUNTER (OUTPATIENT)
Age: 53
End: 2023-05-23

## 2023-05-23 VITALS
HEART RATE: 56 BPM | RESPIRATION RATE: 16 BRPM | TEMPERATURE: 98 F | HEIGHT: 66 IN | DIASTOLIC BLOOD PRESSURE: 78 MMHG | SYSTOLIC BLOOD PRESSURE: 113 MMHG | OXYGEN SATURATION: 99 % | WEIGHT: 142.2 LBS

## 2023-05-23 VITALS
HEART RATE: 55 BPM | TEMPERATURE: 97 F | OXYGEN SATURATION: 96 % | DIASTOLIC BLOOD PRESSURE: 65 MMHG | RESPIRATION RATE: 18 BRPM | SYSTOLIC BLOOD PRESSURE: 120 MMHG

## 2023-05-23 DIAGNOSIS — Z98.891 HISTORY OF UTERINE SCAR FROM PREVIOUS SURGERY: Chronic | ICD-10-CM

## 2023-05-23 RX ORDER — ACETAMINOPHEN 500 MG
1000 TABLET ORAL ONCE
Refills: 0 | Status: COMPLETED | OUTPATIENT
Start: 2023-05-23 | End: 2023-05-23

## 2023-05-23 RX ORDER — SODIUM CHLORIDE 9 MG/ML
1000 INJECTION, SOLUTION INTRAVENOUS
Refills: 0 | Status: DISCONTINUED | OUTPATIENT
Start: 2023-05-23 | End: 2023-05-23

## 2023-05-23 RX ORDER — OXYCODONE HYDROCHLORIDE 5 MG/1
5 TABLET ORAL ONCE
Refills: 0 | Status: DISCONTINUED | OUTPATIENT
Start: 2023-05-23 | End: 2023-05-23

## 2023-05-23 RX ORDER — LAMOTRIGINE 25 MG/1
1 TABLET, ORALLY DISINTEGRATING ORAL
Refills: 0 | DISCHARGE

## 2023-05-23 RX ADMIN — Medication 1000 MILLIGRAM(S): at 07:27

## 2023-05-23 NOTE — ASU DISCHARGE PLAN (ADULT/PEDIATRIC) - CARE PROVIDER_API CALL
Anabella Balderas  SURGERY  [address] [address]  Phone: [phone]  Fax: [phone]  Follow Up Time:

## 2023-05-23 NOTE — BRIEF OPERATIVE NOTE - NSICDXBRIEFPROCEDURE_GEN_ALL_CORE_FT
PROCEDURES:  Excision, breast, central duct 23-May-2023 10:22:41 Left breast bloody discharge Joey Diamond  
3

## 2023-05-23 NOTE — BRIEF OPERATIVE NOTE - OPERATION/FINDINGS
Patient prepped and draped with maximal sterile technique. Rochelle-areolar incision made from 6oClock to 12oClock. Breast tissue dissected down and lactiferous ducts identified, ligated w/3-0 silk, divided and excised with electrocautery. 2 further remanent cuts identified after continued expression of bloody discharge and removed sharply. No further bloody discharge was identified. Hemostasis meticulously confirmed and surgical site copiously irrigated. Breast tissue closed with chromic. Skin approximated with monocryl deep dermals and running subcuticular.

## 2023-05-23 NOTE — ASU PREOP CHECKLIST - HEIGHT IN CM
Exam under anesthesia revealed 8wk size anteverted uterus. On hysteroscopy, endometrial cavity visualized with normal ostia bilaterally. Thick and fluffy-appearing endometrial lining noted. Small amounts of tissue obtained on sharp curettage.
167.64

## 2023-05-23 NOTE — PRE-ANESTHESIA EVALUATION ADULT - NSANTHOSAYNRD_GEN_A_CORE
No. KACIE screening performed.  STOP BANG Legend: 0-2 = LOW Risk; 3-4 = INTERMEDIATE Risk; 5-8 = HIGH Risk

## 2023-05-23 NOTE — ASU DISCHARGE PLAN (ADULT/PEDIATRIC) - MEDICATION INSTRUCTIONS
For pain, you may take 400mg of Ibuprofen every 6 hours as needed and 650mg of Tylenol every 6 hours as needed. Keep track of the medication you take and alternate between Tylenol and Ibuprofen every 3 hours.

## 2023-05-23 NOTE — ASU DISCHARGE PLAN (ADULT/PEDIATRIC) - ASU DC SPECIAL INSTRUCTIONSFT
Please follow up with Dr. Balderas as scheduled. Call [phone] to confirm your appointment.    Do not remove your dressings until Saturday 5/27.    Please take Tylenol 650mg at 2PM and Ibuprofen at 5PM. Alternate between tylenol and ibuprofen every 3 hours. You do not have to wake up to take the medications but keep track of the last medication you take and continue alternating medications as needed.    General Discharge Instructions:  Please resume all regular home medications unless specifically advised not to take a particular medication. Also, please take any new medications as prescribed.  Please get plenty of rest, continue to ambulate several times per day, and drink adequate amounts of fluids. Avoid lifting weights greater than 5-10 lbs until you follow-up with your surgeon, who will instruct you further regarding activity restrictions.  Avoid driving or operating heavy machinery while taking pain medications.  Please follow-up with your surgeon and Primary Care Provider (PCP) as advised.  Incision Care:  *Please call your doctor or nurse practitioner if you have increased pain, swelling, redness, or drainage from the incision site.  *Avoid swimming and baths until your follow-up appointment.    Warning Signs:  Please call your doctor or nurse practitioner if you experience the following:  *You experience new chest pain, pressure, squeezing or tightness.  *New or worsening cough, shortness of breath, or wheeze.  *If you are vomiting and cannot keep down fluids or your medications.  *You are getting dehydrated due to continued vomiting, diarrhea, or other reasons. Signs of dehydration include dry mouth, rapid heartbeat, or feeling dizzy or faint when standing.  *You see blood or dark/black material when you vomit or have a bowel movement.  *You experience burning when you urinate, have blood in your urine, or experience a discharge.  *Your pain is not improving within 8-12 hours or is not gone within 24 hours. Call or return immediately if your pain is getting worse, changes location, or moves to your chest or back.  *You have shaking chills, or fever greater than 101.5 degrees Fahrenheit or 38 degrees Celsius.  *Any change in your symptoms, or any new symptoms that concern you.

## 2023-05-30 LAB — SURGICAL PATHOLOGY STUDY: SIGNIFICANT CHANGE UP

## 2023-10-11 NOTE — ED BEHAVIORAL HEALTH ASSESSMENT NOTE - NSBHMSEBODY_PSY_A_CORE
Average build Sarecycline Pregnancy And Lactation Text: This medication is Pregnancy Category D and not consider safe during pregnancy. It is also excreted in breast milk.

## 2025-01-15 NOTE — ED CDU PROVIDER DISPOSITION NOTE - DISCHARGE REVIEW MATERIAL PRESENTED
[place]
.
bp 162/78 hr 75 Pt is alert and oriented x 3 no focal neuro deficits on examined and pt sts he has a pcp to follow up. Pt is given and explained all test reports and agreed with the discharge. Pt appears very comfortable

## (undated) DEVICE — PACK BREAST RECONSTRUCTION

## (undated) DEVICE — SUT CHROMIC 2-0 27" CP-2

## (undated) DEVICE — POSITIONER FOAM EGG CRATE ULNAR 2PCS (PINK)

## (undated) DEVICE — VENODYNE/SCD SLEEVE CALF MEDIUM

## (undated) DEVICE — MARKING PEN W RULER

## (undated) DEVICE — DRAPE PROBE COVER LATEX FREE 3X96"

## (undated) DEVICE — WARMING BLANKET LOWER ADULT

## (undated) DEVICE — SUT MONOCRYL 4-0 18" PS-2

## (undated) DEVICE — SYR LUER LOK 50CC

## (undated) DEVICE — SUT SILK 2-0 30" PSL

## (undated) DEVICE — SUT PLAIN GUT 3-0 27" PS-2

## (undated) DEVICE — DRSG STERISTRIPS 0.5 X 4"

## (undated) DEVICE — GLV 6 PROTEXIS (WHITE)